# Patient Record
Sex: MALE
[De-identification: names, ages, dates, MRNs, and addresses within clinical notes are randomized per-mention and may not be internally consistent; named-entity substitution may affect disease eponyms.]

---

## 2017-07-21 ENCOUNTER — HOSPITAL ENCOUNTER (INPATIENT)
Dept: HOSPITAL 68 - ERH | Age: 32
LOS: 7 days | DRG: 750 | End: 2017-07-28
Attending: PSYCHIATRY & NEUROLOGY | Admitting: PSYCHIATRY & NEUROLOGY
Payer: COMMERCIAL

## 2017-07-21 VITALS — WEIGHT: 301.37 LBS | HEIGHT: 71 IN | BODY MASS INDEX: 42.19 KG/M2

## 2017-07-21 DIAGNOSIS — F25.9: Primary | ICD-10-CM

## 2017-07-21 DIAGNOSIS — I10: ICD-10-CM

## 2017-07-21 LAB
ABSOLUTE GRANULOCYTE CT: 4.7 /CUMM (ref 1.4–6.5)
BASOPHILS # BLD: 0 /CUMM (ref 0–0.2)
BASOPHILS NFR BLD: 0.4 % (ref 0–2)
EOSINOPHIL # BLD: 0.2 /CUMM (ref 0–0.7)
EOSINOPHIL NFR BLD: 2.5 % (ref 0–5)
ERYTHROCYTE [DISTWIDTH] IN BLOOD BY AUTOMATED COUNT: 13.7 % (ref 11.5–14.5)
GRANULOCYTES NFR BLD: 57.4 % (ref 42.2–75.2)
HCT VFR BLD CALC: 42 % (ref 42–52)
LYMPHOCYTES # BLD: 2.6 /CUMM (ref 1.2–3.4)
MCH RBC QN AUTO: 28.5 PG (ref 27–31)
MCHC RBC AUTO-ENTMCNC: 33.2 G/DL (ref 33–37)
MCV RBC AUTO: 85.9 FL (ref 80–94)
MONOCYTES # BLD: 0.6 /CUMM (ref 0.1–0.6)
PLATELET # BLD: 195 /CUMM (ref 130–400)
PMV BLD AUTO: 9.4 FL (ref 7.4–10.4)
RED BLOOD CELL CT: 4.89 /CUMM (ref 4.7–6.1)
WBC # BLD AUTO: 8.2 /CUMM (ref 4.8–10.8)

## 2017-07-21 PROCEDURE — G0463 HOSPITAL OUTPT CLINIC VISIT: HCPCS

## 2017-07-21 PROCEDURE — G0480 DRUG TEST DEF 1-7 CLASSES: HCPCS

## 2017-07-21 NOTE — NUR
PT REQUESTING TO TAKE A SHOWER. EDUCATED THAT BLOODWORK AND URINE NEED TO BE
OBTAINED AND CHANGED INTO SCRUBS
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
## 2017-07-21 NOTE — NUR
PT SLEEPING WITH RR, BILATERAL CHEST RISE AND FALL NOTED. PT NOTED TO BE
SLEEPING ON ABD/LEFT SIDE SNORING. SITTER IN PLACE FOR SAFETY.
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
## 2017-07-21 NOTE — NUR
blood drawn and sent (blue sst lav) and urine trio sent. pt changed into blue
scrubs. medicated with haldol and atarax per Emar. Patient states, "my family
doesn't care that i want to kill myself and i have a plan. I have heart
failure, they don't care. I've committed a lot of sins and I dont have a wife.
My family thinks I'm retarded. Can i go to [facility] now?"

## 2017-07-21 NOTE — NUR
PT SLEEPING WITH RR, WILL CONTINUE TO MONITOR, SITTER IN PLACE FOR SAFETY. EAR
PLUGS PROVIDED TO PT BY THIS RN FOR SLEEP AND COMFORT.

## 2017-07-22 NOTE — NUR
PT REQUESTING ATARAX AND HALDOL.  CONSULT WITH MD FOR ORDERS. PT WAS CALM AND
COOPERATIVE AND RETING ON THE STRETCHER.

## 2017-07-22 NOTE — NUR
PT AWAKE AND ASKING FOR TISSUES AND WHEN BREAKFAST WILL ARRIVE, THIS RN
PROVIDED PT WITH TISSUES AND TOLD PT 0700. SITTER IN PLACE FOR SAFETY.

## 2017-07-22 NOTE — NUR
PT RESTING QUIETLY ON STRETCHER. NAD NOTED. VSS. PT USED PHONE AND HAS BEEN
CALM AND COOPERATIVE WITH STAFF AT THIS TIME.

## 2017-07-22 NOTE — NUR
PT C/O ANXIETY, BECOMING AGITATED, REQUESTING MEDS TO HELP HIM TO SLEEP.
MD TESSIE MADE AWARE. 
PT MEDICATED WITH HALDOL, ATIVAN, AND ATARAX PER EMAR.
SITTER AT BEDSIDE.

## 2017-07-22 NOTE — NUR
RESUMED CARE OF PT FROM PREVIUS RN. PT RESTING QUIETLY ON STRETCHER. PT ADVISED
AWAITING FOR CONULT WITH [id number]. NAD NOTED WILL CONTINUE TO MONITOR.

## 2017-07-22 NOTE — NUR
ASSUMED CARE OF PT PER RN CHELCIA. PT SLEEPING WITH RR, WILL CONTINUE TO
MONITOR. SITTER IN PLACE FOR SAFETY.
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
## 2017-07-22 NOTE — NUR
PT SLEEPING WITH RR, TOSSING AND TURNING, BILATERAL CHEST RISE AND FALL NOTED.
WILL CONTINUE TO MONITOR, SITTER IN PLACE FOR SAFETY.

## 2017-07-22 NOTE — NUR
Patient on PEC, crisis conducting bed search.
Referrals sent to [facility], [facility], and [facility].

## 2017-07-22 NOTE — NUR
PT BECOMING ANXIOUS AND ARGUMENTITIVE WITH SITTER REGARDING LEAVING THE
DEPARTMENT BECAUSE HE HAS NOT BEEN SEEN BY CRISIS. CHARGE RN CURRENTLY SPEAKING
WITH PATIENT

## 2017-07-23 NOTE — NUR
PT SLEEPING WTIH RR, BILATERAL CHEST RISE AND FALL NOTED. SITTER IN PLACE FOR
SAFETY. WILL CONTINUE TO MONITOR

## 2017-07-23 NOTE — NUR
MED WITH ATARAX AND HALDOL AS PER PATIENT REQUEST. SITTER REMAINS IN PLACE FOR
CONSTANT OBSERVATION AND PATIENT SAFETY.

## 2017-07-23 NOTE — NUR
PT COOPERATIVE IN ROOM WITH SITTERS PRESENT. CONTINUES TO PRESENT WITH SOMEWHAT
BIZARRE, FLAT AFFECT. COOPERATIVE AND FOLLOWING BEHAVIORAL EXPECTATIONS AT THIS
TIME. OFFERS NO PHYSICAL COMPLAINTS

## 2017-07-23 NOTE — NUR
PATIETN AMB TO RESTROOM. DID NOT REQUIRE ASSISTANCE - SITTER IN PLACE FOR
SAFETY. AMB RETURN TO STRETCHER WITHOUT ASSISTANCE.

## 2017-07-23 NOTE — NUR
PT WAS FOUND TO BE INAPPROPRIATELY TOUCHING HIMSELF IN THE HALLWAY. PT ADVISED
THIS WAS NOT APPROPRIATE BEHAVIOR AND WAS EASILY RE-DIRECTED. pT IS STILL VERY
ANXIOUS IN REGARDS TO HIS PLAN OF CARE. AWAITING FURTHER ORDERS AND DISPO, NAD
NOTED. VSS.
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
## 2017-07-23 NOTE — NUR
RESUMED CARE OF PT FROM PREVIOUS RN.
PT RESTING QUIETLY ON STRETCHER. PT IS CALM AND COOPERATIVE WITH STAFF. NAD
NOTED. VSS.

## 2017-07-23 NOTE — NUR
PT NOW PLACED IN RM 14. PT PROVIDED SNACK AND TOOK SHOWER PRIOR TO DINNER. PT
IS CALM AD COOPERATIVE AT THIS TIME. NAD NOTED. VSS.

## 2017-07-23 NOTE — NUR
patient remains sleeping at present. lights remain dimmed to promote rest and
comfort. sitter remains in place for constant observation and patient safety.
will continue to monitor.

## 2017-07-23 NOTE — NUR
PT RESTING ON STRETCHER. PT VOICES CONCERN THAT HIS "MEDICATIONS ARE NOT
STABILIZING [HIM]". pT APPEARS SLIGHTLY ANXIOUS BUT IS STILL ABLE TO BE
RE-DIRECTED. PT PROVIDED A SNACK. NAD NOTED. WILL CONTINUE TO CLOSELY MONITOR.

## 2017-07-23 NOTE — NUR
PT RESTING ON STRETCHER IN HALLWAY STATING HE COULD NOT BREATH. PT IN AD. PT
STATES IN Hungarian THAT HE IS "HERE TO REGULATE HIMSELF MEDS". PT WAS ABLE TO BE
EASILY RE-DIRECTED. PT PROVIDED A SNACK AND CONSTANT OBS MAINTAINED. NAD NOTED.
VSS.

## 2017-07-23 NOTE — NUR
NOTIFIED OF PTS BP BY [facility] SAVANNAH. MD AWARE. PLAN IS TO RECHECK IN AN HOUR

## 2017-07-24 VITALS — DIASTOLIC BLOOD PRESSURE: 97 MMHG | SYSTOLIC BLOOD PRESSURE: 165 MMHG

## 2017-07-24 NOTE — NUR
PT UP IN ROOM, STATING "I NEED TO GO, I HAVE COURT AND I HAVE  CARE." PT
REDIRECTABLE AT THIS TIME, UNDERSTANDS THAT HE NEEDS TO WAIT FOR CRISIS,
HOWEVER, STATING "THEY NEED TO SEE ME FIRST, I CANT STAY HERE, IM NOT GOING TO
OTHER HOSPITALS."

## 2017-07-24 NOTE — NUR
PT ADJUSTING TO THE UNIT. PT SOMEWHAT BIZARRE, BUT NO THREATENING BEHAVIORS.
PT QUIET AND ON THE PERIPHERY.
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
## 2017-07-24 NOTE — NUR
ELIAZAR RN FROM "[employer] [facility]" RETURNED CALL FROM A [employer] STAFF
MEMBER-POSSIBLY MARKO . ELIAZAR'S NUMBER [phone] IF WE HAVE
ANY QUESTIONS FOR HER

## 2017-07-24 NOTE — NUR
ASSUMED CARE OF PT. PT SITTING IN CHAIR IN ROOM. ASKING FOR SOMETHING TO EAT.
OTHERWISE WITHOUT COMPLAINTS
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
## 2017-07-24 NOTE — NUR
PT REMAINS ASLEEP AT THIS TIME W/RR NOTED. NAD. EQUAL CHEST RISE AND FALL.
SITTER REMAINS IN CLEAR VIEW FOR SAFETY.

## 2017-07-25 VITALS — SYSTOLIC BLOOD PRESSURE: 152 MMHG | DIASTOLIC BLOOD PRESSURE: 95 MMHG

## 2017-07-25 VITALS — SYSTOLIC BLOOD PRESSURE: 144 MMHG | DIASTOLIC BLOOD PRESSURE: 85 MMHG

## 2017-07-25 VITALS — SYSTOLIC BLOOD PRESSURE: 145 MMHG | DIASTOLIC BLOOD PRESSURE: 85 MMHG

## 2017-07-25 VITALS — DIASTOLIC BLOOD PRESSURE: 84 MMHG | SYSTOLIC BLOOD PRESSURE: 152 MMHG
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
## 2017-07-25 NOTE — NUR
PT IS EASILY IRRITATED THIS SHIFT. BECAME ANGRY WHEN HE THOUGHT THE "NURSE
DIDN'T CALL ME FOR MY MEDS", WHEN IN REALITY HE DIDN'T HAVE ANY MEDICATION DUE
AT THE MOMENT. PT CAN BE DEMANDING AT TIMES ASKING FOR PAPER, COLORING
PENS/MARKERS. PT IS REDIRECTABLE FOR THE MOST PART. INTERMITTENTLY ATTENDING
GROUPS THIS AM. VS ARE STABLE AND DENIES ANY SI/HI TO THIS MHW.

## 2017-07-25 NOTE — NUR
PT IN ROOM MAJORITY OF THE EVENING.  HIS INTERACTIONS WITH HIS PEERS HAVE BEEN
MINIMAL.  PT IS EASILY IRRITATED ABOUT SMALL THINGS SUCH AS OPENING HIS DOOR
TO CHECK ON HIM, NOT PASSING OUT MEALS RIGHT AWAY WERE JUST A FEW THINGS THAT
IRRITATED HIM TONIGHT. PT SPENT MOST OF THE NIGHT DRAWING PICTURES OF
ROOSTERS, AND CHICKENS.  HE IS DEMANDING WHEN HE DOES NOT GET HIS NEEDS MET
RIGHT AWAY.  PT DENIES THOUGHTS TO HURT HIMSELF WHEN ASKED.
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
## 2017-07-25 NOTE — SOCIAL WORKER PROG NOTE PSYCH
Social Work Progress Note
Progress Note
11:10am
This writer met with patient.  Patient presented as guarded and irritable.  
Patient was unwilling to discuss current stressors and/or events that led to 
this hospitalization other than to say that he had been suicidal.  Upon 
discussing outpatient providers, patient acknowldeged that he has been engaged 
in treatment at [facility] and would like to return to them after discharge.  
Patient's irritability increased when reviewing the [facility]MOND for  [facility] and he left 
the room.  Patient will be approached at a later time regarding the ARMOND.
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
## 2017-07-26 VITALS — SYSTOLIC BLOOD PRESSURE: 142 MMHG | DIASTOLIC BLOOD PRESSURE: 66 MMHG

## 2017-07-26 VITALS — SYSTOLIC BLOOD PRESSURE: 144 MMHG | DIASTOLIC BLOOD PRESSURE: 60 MMHG

## 2017-07-26 VITALS — SYSTOLIC BLOOD PRESSURE: 160 MMHG | DIASTOLIC BLOOD PRESSURE: 95 MMHG

## 2017-07-26 VITALS — SYSTOLIC BLOOD PRESSURE: 134 MMHG | DIASTOLIC BLOOD PRESSURE: 72 MMHG

## 2017-07-26 NOTE — NUR
PT DID NOT ATTEND ANY GROUPS THIS MORNING AND HE ISOLATED IN HIS ROOM. HE
TAKES HIS MEDS WITHOUT A PROBLEM. HE DENIED ANY THOUGHTS OF SUICIDE OR SELF
HARM WHEN ASKED

## 2017-07-26 NOTE — NUR
PT IS ISOLATIVE AND WITHDRAWN, REMAINING IN ROOM FOR MAJORITY OF EVENING.
MINIMAL INTERACTION WITH PEERS OR STAFF. PT HAS A VERY IRRITABLE EDGE AND
REPORTS "I WANT TO LEAVE RIGHT NOW." OVERALL COOPERATIVE AND COMPLIANT WITH
STAFF. NO COMPLAINTS OR SI REPORTED. PT HAS ANXIOUS MOOD AND
CONSTRICTED/IRRITABLE AFFECT.

## 2017-07-26 NOTE — SOCIAL WORKER PROG NOTE PSYCH
Social Work Progress Note
Progress Note
This writer was informed by nursing staff that the patient was meeting with his 
, Cathie Mcclellan, from [facility] who requested to meet with me.  This 
writer introduced self to VICTOR M Mcclellan.  Patient inquired as to why I was there 
and was reminded that I am patient's .  He continues to present as 
guarded with this writer.  Patient was in agreement with this writer speaking 
with VICTOR M Mcclellan following their discussion.
 
3:22pm
Cathie Mcclellan was contacted by phone as this writer was not available to meet 
with her following her meeting with the patient.  She stated that the patient 
typically presents as defensive and paranoid when he is asked questions.  She 
stated that he is guarded with his therapist at [facility] and generally more open/
less guarded with the [facility] psychiatrist.  Cathie stated that in addition to 
medication management, individual therapy and case management he utilizes 
visiting nurse services, [facility], (primary nurse, Sarah Orantes RN, [phone]
[phone]).  Cathie also added that the patient has a pending court case and will 
inform this writer if she is informed of the date.  [facility] will be contacted 
when a discharge date is identified in order to schedule outpatient 
appointments.

## 2017-07-26 NOTE — CP SOUTH PROGRESS NOTE PSYCH
Psych (Inpt) Progress Note
Progress Note
Include the following elements, when applicable:
Involvement in the active treatment of the patient with behavioral observations 
of the patient and the patient's response to the treatment.
Review of the ongoing treatment process in the context of the treatment plan.
Indication of how multi-disciplinary staff members are carrying out the 
treatment plan.
Plans for future interventions and recommendations for revision of the treatment
plan.
Liaison with other physicians/providers.
Progress Note:
I discussed this patient's progress to date, current mental status, treatment 
process in the context of the treatment plan, and discharge planning with staff/
team in the daily morning inpatient team meeting. I also met with the patient  
myself in individual session.
 
              Current Medications
 
 
  Sig/Santos Start time  Last
 
Medication Dose Route Stop Time Status Admin
 
Benztropine Mesylate 1 MG BID 07/24 2200 AC 07/26
 
  PO   0939
 
Haloperidol 100 MG Q30D 07/28 1000 CANr 
 
  IM   
 
Haloperidol 5 MG .STK-MED ONE 07/25 1820 DC 
 
  PO 07/25 1821  
 
Haloperidol 10 MG AT BEDTIME 07/24 2200 AC 07/25
 
  PO   2156
 
Haloperidol 5 MG Q6-PRN PRN 07/24 2145 AC 07/25
 
  PO   1820
 
Haloperidol 100 MG .[EVERY 4 WEEKS] 07/24 2130 CAN 
 
  IM   
 
Hydroxyzine HCl 25 MG Q6-PRN PRN 07/24 2145 AC 07/25
 
  PO   2158
 
Nicotine 2 MG Q2 HRS AS NEEDED PRN 07/24 2145 AC 07/25
 
  PO   1819
 
 
                                                                              
Vital Signs
 
 
Date Time Temp Pulse Resp B/P B/P Pulse O2 O2 Flow FiO2
 
     Mean Ox Delivery Rate 
 
07/26 0820 97.6 64  134/72     
 
07/25 2019 98.1 71  144/85     
 
07/25 1603  77  152/95     
 
07/25 1236  60  145/85     
 
 Laboratory Tests
 
 
 07/26
 
 0625
 
Chemistry 
 
  Potassium (3.5 - 5.1 mmol/L) 4.2
 
  Triglycerides (<150 mg/dL) 94
 
  Cholesterol (< 200 MG/DL) 151
 
  LDL Cholesterol, Calc (65 - 129 mg/dL) 108
 
  HDL Cholesterol (40 - 60 mg/dL) 25  L
 
  Cholesterol/HDL Ratio (0.00 - 4.88 %) 6  H
 
  TSH (0.270 - 4.200 uIU/mL) 3.660
 
 
 
A:
Chart, progress notes, vital signs and medication list reviewed. Vital signs 
within normal limits. TSH wnl. Low HDL and high Cholesterol/HDL ratio. Rpt 
potassium wnl. 
 
Per nursing staff, patient shows better behavioral and physical control today. 
Less irritable. More visible in the milieu, drawing. Interacting minimally but 
appropriately with staff. 
 
[facility] confirmed that patient is an active patient. Current 
medication list received: Haldol 10mg nightly, Haldol Decanoate 100mg IM [id number]
, Hydroxyzine 50mg BID PRN anxiety. Haldol Dec last administered on 6/27/17, 
confirmed by nursing supervisor, Maritza, of [facility].
 
Spoke to MERE Nguyễn, from [facility]. States he has worked with 
patient for the last 2-3 years. At baseline patient is paranoid and suspicious 
of is family and neighbors. To Gopi' knowledge he has never physically acted 
out towards his neighbors. Reports that in February 2017 he had been on Haldol 
20mg po nightly for psychosis, which he did well on. Reports patient tolerated 
this w/o symptoms of EPS. However, patient had reported that he did not like how
he felt on this dose. He began refusing dosage. At that time, Gopi had a 
conversation with Dr. Wilson from [facility] which resulted in Dr. Wilson 
decreasing HS dose to 10mg nightly. Gopi reports that the patient's symptoms 
were better managed on 20mg nightly in addition to Haldol Dec. He confirmed 
Haldol Dec dosage of 100mg IM [id number]. 
 
Patient seen at 3:00PM. Patient reports meeting /  [facility]  Cathie on 
unit today. He requested tx team speaks to her to arrange after care 
appointments. Reasurred patient that would take place. States feeling on edge 
today. Appears quite somatic. Reports he feels he had a heart attack last night.
States he had one at age 24 in [place]. Could not remember the name of the hospital 
where he had been hospitalized. Denies being treated by a cardiologist in his 
lifetime. VSS. Denies palpitations, shortness of breath, dizziness. No 
significant cardiac findings on H&P per internist Dr. Bry Ames. Patient
presently asymptomatic. 
Today, patient denies auditory and visual hallucinations. + somatic paranoia, 
preoccupied. Feels his body is "destructing." Could not articulate what he means
by this. Became briefly tearful. Reports racing thoughts, variable moods. Made 
inconsistent reports regarding his quality of sleep. First stated he cannot 
sleep at night, then reports sleeping well by using "strategies," i.e. counting 
backwards from 100. Reports appetite and energy level are normal. 
 
Recommendation made to add Depakote for mood stabilization. Patient refused. 
Stated he will only take Haldol. He requested an increase in HS dose to 20mg. 
Informed him we will first obtain an EKG to r/o any contraindication to increase
current dose of Haldol. Also informed patient that he is due for next dose of 
Haldol Decanoate tomorrow. He verbalized understanding, is agreeable.
 
Patient tolerating medications well, denies SEs. Amenable to tx plan discussed 
above.
 
P:
-monitor for safety, mood, psychosis, SI.
-obtain EKG; if wnl will increase Haldol to 15mg po nightly. Will continue PRN 
Haldol 5mg Q6H agitation/psychosis. 
-cont Cogentin 1mg BID.
-Haldol Decanoate 100mg IM scheduled for tomorrow.
-dispo planning per primary team.

## 2017-07-27 VITALS — SYSTOLIC BLOOD PRESSURE: 140 MMHG | DIASTOLIC BLOOD PRESSURE: 73 MMHG

## 2017-07-27 VITALS — DIASTOLIC BLOOD PRESSURE: 82 MMHG | SYSTOLIC BLOOD PRESSURE: 130 MMHG

## 2017-07-27 VITALS — DIASTOLIC BLOOD PRESSURE: 95 MMHG | SYSTOLIC BLOOD PRESSURE: 145 MMHG

## 2017-07-27 VITALS — SYSTOLIC BLOOD PRESSURE: 120 MMHG | DIASTOLIC BLOOD PRESSURE: 94 MMHG

## 2017-07-27 NOTE — NUR
PT IN ROOM MOST OF THE EVENING DRAWING.  HE IS IRRITABLE AT TIMES, BUT KEEPS
TO HIMSELF OTHERWISE.  PT IN THE [place] FOR DINNER, AND HAS BEEN COMING OUT
FOR VITALS.  HE DENIES THOUGHTS TO HURT HIMSELF WHEN ASKED.

## 2017-07-27 NOTE — NUR
PT IS IRRITABLE AT TIMES-- YELLING AT STAFF MEMBERS FOR OPENING HIS DOOR
DURING ROUTINE Q15'S. PT WAS EXPLAINED TO THAT THIS IS ROUTINE AND NECESSARY
AND HE RETREATED BACK TO HIS ROOM. PT IS EASILY AGITATED WHEN ASKED TO COME
OUT FOR VS/MEDICATIONS. NOT ATTENDING GROUPS. VS ARE STABLE AND DENIES ANY
SI/HI TO THIS [facility]HW.

## 2017-07-27 NOTE — CP SOUTH PROGRESS NOTE PSYCH
Psych (Inpt) Progress Note
Progress Note
Include the following elements, when applicable:
Involvement in the active treatment of the patient with behavioral observations 
of the patient and the patient's response to the treatment.
Review of the ongoing treatment process in the context of the treatment plan.
Indication of how multi-disciplinary staff members are carrying out the 
treatment plan.
Plans for future interventions and recommendations for revision of the treatment
plan.
Liaison with other physicians/providers.
Progress Note:
I discussed this patient's progress to date, current mental status, treatment 
process in the context of the treatment plan, and discharge planning with staff/
team in the daily morning inpatient team meeting. I also met with the patient  
myself in individual session.
 
 Current Medications
 
 
  Sig/Santos Start time  Last
 
Medication Dose Route Stop Time Status Admin
 
Benztropine Mesylate 1 MG Q4 HRS AS NEEDED PRN 07/26 1545 AC 
 
  PO   
 
Benztropine Mesylate 1 MG BID 07/24 2200 AC 07/27
 
  PO   0750
 
Haloperidol 100 MG Q30D 07/27 1000 CAN 
 
  IM 07/27 1001  
 
Haloperidol 100 MG Q30D 07/27 1000 DC 07/27
 
  IM 07/27 1001  1146
 
Haloperidol 15 MG AT BEDTIME 07/26 2200 AC 07/26
 
  PO   2214
 
Haloperidol 5 MG Q6-PRN PRN 07/24 2145 AC 07/27
 
  PO   0750
 
Hydroxyzine HCl 50 MG .STK-MED ONE 07/26 1942 DC 
 
  PO 07/26 1943  
 
Hydroxyzine HCl 25 MG Q6-PRN PRN 07/24 2145 AC 07/26
 
  PO   1941
 
Lorazepam 1 MG Q4 HRS AS NEEDED PRN 07/27 1000 AC 
 
  PO   
 
Nicotine 4 MG Q2 HRS AS NEEDED PRN 07/26 1515 AC 
 
  PO   
 
 
                                                                         Vital 
Signs
 
 
Date Time Temp Pulse Resp B/P B/P Pulse O2 O2 Flow FiO2
 
     Mean Ox Delivery Rate 
 
07/27 1213  84  130/82     
 
07/27 0744 98.2 61  120/94     
 
07/26 2001 98.5 61  160/95     
 
07/26 1717  66  144/60     
 
 
A:
Chart, progress notes, vital signs and medication list reviewed. Vital signs 
within normal limits. EKG reviewed, showed sinus rhythm with no significant 
changes. No new lab results today.
 
Per nursing staff, patient keeps mostly to himself, somewhat guarded, minimally 
interacts with others. Less irritable, attending to adls, not attending groups.
 
Patient seen in room at 3:43PM. Initially observed laying in bed. Acknowledged 
writer and sat up to talk. States that his "mental health is well." Reports mood
is "normal." Patient focused on discharge, anxious about court appearance on 
Monday, 7/31/17 for recent disorderly conduct charge. Denies acute symptoms of 
anxiety and depression. Denies feeling hopeless, helpless, worthless and guilty.
Denies AH and VH. Still guarded, but less suspicious today than in prior 
encounters. Offered no somatic complaints. Remains focused on returning to  
[facility] for outpatient tx. Denies SI and HI. No evidence of overt paranoia towards 
anything particularly, or of delusional/illogical thought content. Thought 
process concrete, goal directed. Cognition grossly intact.
 
Pt reports tolerating recent increase in Haldol to 15mg at bedtime. Received 
Haldol Dec 100mg IM today. Denies SEs, no evidence of movement disorder, 
agreeable to continue taking.  
 
P:
-cont monitoring for safety, mood, SI, psychosis.
-cont Haldol 15mg nightly, cont Cogentin 1mg BID; cont Haldol 5mg Q6H PRN for 
agitation/psychosis.
-tentative discharge tomorrow w/ f/u at  [facility] for individual therapy/med 
management/case management.

## 2017-07-27 NOTE — SOCIAL WORKER PROG NOTE PSYCH
**See Addendum**
Social Work Progress Note
Progress Note
This writer spoke with patient regarding interest in a family meeting.  Patient 
initially appeared interested, however, ultimately stated that a meeting with 
his family is not needed as "I'm 32 years old."  Patient continues to present as
guarded during his discussion with this writer.

## 2017-07-27 NOTE — SOCIAL WORKER PROG NOTE PSYCH
Social Work Progress Note
Progress Note
 
 
Determination Status:
****************************     PENDED     ****************************
The services requested require additional review. You will be contacted 
regarding the status of this request if further information is needed. An 
authorization decision will be made within the required timeframes and details 
of that decision may be found under the member's authorization history.
Member Name
Member ID
Member 
Subscriber Name
Subscriber ID
BRENT ROMERO JR
[id number]
1985
BRENT ROMERO  
[id number]
 
Pended Authorization #
Client Authorization #
Type of Request
 
[id number]
[id number]
CONCURRENT
 
 
Date of Admission/ Start of Services
Requested From
Submission Date
   
2017

## 2017-07-28 VITALS — DIASTOLIC BLOOD PRESSURE: 73 MMHG | SYSTOLIC BLOOD PRESSURE: 125 MMHG

## 2017-07-28 NOTE — CP SOUTH PROGRESS NOTE PSYCH
Psych (Inpt) Progress Note
Progress Note
Include the following elements, when applicable:
Involvement in the active treatment of the patient with behavioral observations 
of the patient and the patient's response to the treatment.
Review of the ongoing treatment process in the context of the treatment plan.
Indication of how multi-disciplinary staff members are carrying out the 
treatment plan.
Plans for future interventions and recommendations for revision of the treatment
plan.
Liaison with other physicians/providers.
Progress Note:
Chart, progress notes, labs, vital signs and medication list reviewed. Vital 
signs within normal limits. No new lab results today. 
 
 Current Medications
 
 
  Sig/Santos Start time  Last
 
Medication Dose Route Stop Time Status Admin
 
Benztropine Mesylate 1 MG Q4 HRS AS NEEDED PRN 07/26 1545 AC 
 
  PO   
 
Benztropine Mesylate 1 MG BID 07/24 2200 AC 07/28
 
  PO   0821
 
Haloperidol 100 MG Q30D 07/27 1000 DC 07/27
 
  IM 07/27 1001  1146
 
Haloperidol 15 MG AT BEDTIME 07/26 2200 AC 07/27
 
  PO   2145
 
Haloperidol 5 MG Q6-PRN PRN 07/24 2145 AC 07/27
 
  PO   0750
 
Hydroxyzine HCl 25 MG Q6-PRN PRN 07/24 2145 AC 07/26
 
  PO   1941
 
Lorazepam 1 MG Q4 HRS AS NEEDED PRN 07/27 1000 AC 
 
  PO   
 
Nicotine 4 MG Q2 HRS AS NEEDED PRN 07/26 1515 AC 
 
  PO   
 
 
                                                                    Vital Signs
 
 
Date Time Temp Pulse Resp B/P B/P Pulse O2 O2 Flow FiO2
 
     Mean Ox Delivery Rate 
 
07/28 0744 96.4 92  125/73     
 
07/27 1950 97.3 84  145/95     
 
07/27 1604  92  140/73     
 
07/27 1213  84  130/82     
 
 
A:
Chart, progress notes, vital signs and medication list reviewed. Vital signs 
within normal limits. No new lab results today.
 
Patient seen at 8:40AM. Patient presents in good behavioral and physical 
control. Appears mildly restless, fidgety on encounter. States his mood is 
"good." Appears less guarded since yesterday. Focused on discharge today. States
he would like to speak to his , Cathie, at [facility], regarding retirement 
diversion. Patient determined to get to court on Monday, 7/31/17. Affect 
constricted, slightly irritable. States he is anxious about upcoming court, but 
showed fair insight that recent charge cannot be addressed until he shows to 
court. He denies suicidal and homicidal ideation. Denies auditory and visual 
hallucinations. Slightly paranoid, however, consistent to baseline reports from 
both his  and visiting nurse who have known him for years. No 
evidence of jabier delusions. Thought process concrete, goal directed. Cognition 
grossly intact. Patient showed good eye contact. Speech was irritable at times, 
otherwise normal in rate, tone and volume. He offered no complaints. Reports 
tolerating medications well, denies side effects. There is no evidence of 
movement disorder. Denies akathisia. He reports feeling safe and ready for 
discharge, and is very eager to follow up at [facility] for continued outpatient 
treatment and his visiting nurse.
 
P:
-discharge today to home/self care.
-f/u at  [facility] for outpatient [facility] services and [facility] 
for daily med administration (see D/C instructions for appointments).
-all discharge medications e-prescribed to [facility] in [place], [place], 
today.
-patient advised that in the event of an emergency to call [phone] to the 
nearest emergency department; he verbalized understanding of instructions.

## 2017-07-28 NOTE — SOCIAL WORKER PROG NOTE PSYCH
Social Work Progress Note
Progress Note
This writer met with patient to review discharge plans as follows:
 
-[facility], individual therapy with Ramonita Spaulding on 8/3/17 at 3pm
-[facility], medication appointment with Dr. Wilson on 8/23/17 at 6:20pm
-[facility], case management with Cathie Mcclellan on 8/3/17 at 2pm
-[facility]: visiting nurse will visit patient at his home this 
afternoon/evening
 
Patient was in agreement with attending/accepting these appointments.  He was 
offered an appointment with GENE Weaver through [facility] due to the amount 
of time until his medication appointment at [facility].  He refused this 
appointment.  He was also offered a ride via [employer] from the hospital, 
however, refused this as well.  Patient stated that he has court scheduled for 7
/31/17, however, Cathie Mcclellan () was unable to confirm this.  She 
was unable to meet with him today, but will follow up with him on Monday in 
addition to their appointment on 8/3/17.
 
This writer was provided with the fax number for [facility] (spoke 
with Gopi): [phone].  W10 and patient health summary was faxed to [facility] on 7/28/17 at 2:58pm, fax confirmation sheet printed and filed with 
documents in the patient's chart.
 
This writer faxed W10 and patient health summary to [facility] on 7/28/17 at 2:
58pm.  Fax confirmation sheet and documents filed in patient's chart.

## 2017-07-28 NOTE — NUR
PT IS TENTATIVELY SCHEDULED TO DISCHARGE TODAY, PRESENT WITHIN COMMUNITY AND
SOCIAL WITH PEERS AND STAFF, NO ISSUES OR COMPLAINTS REPORTED OR OBSERVED.
WHEN ASKED DIRECTLY DENIES SI/HI/HA AT THIS TIME. DISCHARGE DISPO DISCUSSED
WITH WITH PT AND HAS A POSITIVE UNDERSTANDING AND VERBALIZES MOTIVATION,
FUTURE ORIENTED, MOOD IS STABLE WITH A FULL RANGE AFFECT. UNDERSTANDS
MEDICATION REGIMEN AND INFORMATION PACKETS RE: SI PREVENTION, DEPRESSION,
SCHIZOAFFECTIVE DISORDER GIVEN AND PT RESOURCE GUIDE REVIEWED WITH ALL OTHER
PERTINANT INFORMATION I.E. [facility], [facility], SUICIDE HOTLINE NUMBERS ETC
AND VERBALIZED UNDERSTANDING AND NO QUESTIONS.

## 2017-07-28 NOTE — DISCHARGE SUMMARY REPORT-PSYCH
Visit Information
 
Visit Dates/Diagnosis'
Admission Date:
07/24/17
 
Discharge Date: 07/28/17
Reason for Admission:
Physical agitation and suicidal ideation in the setting of recent arrest.
Psy Discharge Primary Diag: Schizoaffective disorder
Psy Discharge Secondary Diag: Hypertension by history
 
Hospital Course
Significant Lab Findings:
 Lab
 
 
Cholesterol/HDL Ratio 6 % H 07/26/17 0625
 
HDL Cholesterol 25 mg/dL L 07/26/17 0625
 
 
7/26/17 EKG: Sinus rhythm with a rate of 64. Consider inferior infarct. 
Borderline R wave progression, anterior leads. No significant change since 
previous tracing. Borderline EKG confirmed by cardiologist Dr. Kevin Del Rosario.
 
Course
Complications:
None.
Consultations:
The patient was seen for admission history and physical by internist Dr. Bry Ames. Please see his note for additional information.
Allergies:
Coded Allergies:
No Known Drug Allergies (05/21/16)
 
Hospital Course/TX Response:
The patient was monitored on the unit for safety, mood, suicidal and homicidal 
ideation, agitated behavior and psychosis. He participated in multimodal 
treatments on the unit. Haldol 10mg po nightly was initially continued on 
admission for paranoia. Patient was agreeable to a gradual increase to 15mg 
nightly. The patient had last recieved Haldol Decanoate 100mg IM on 6/27/17, 
which was confirmed with [facility] nursing supervisor. The patient
received his scheduled monthly Haldol Decanoate 100mg IM on  7/27/17 at 11:46AM.
Cogentin 1mg twice daily was continued for muscle stiffness. Hydroxyzine PRN was
continued for anxiety. The patient tolerated all medications well, he denied 
side effects and did not exhibit signs or symptoms of a movement disorder. 
 
During the hospital course, the patient's mood and affect improved. Suicidal 
ideation remitted. He consistently denied auditory and visual hallucinations, 
and homicidal ideation. The patient continued to appear slightly paranoid, 
however, per collateral from both his  at [facility] and visiting nurse,
this is consistent with his baseline. The patient refused having family involved
in a his inpatient care. He was agreeable to return to [facility] for case 
management, individual therapy and medication management. He was also willing to
resume services with [facility] for daily medication 
administration. 
 
On the date of discharge, 7/28/17, the patient presented in good behavioral and 
physical control. Alert and oriented x 3. Appeared mildly restless, fidgety on 
encounter. Stated his mood is "good." Appeared less guarded since yesterday. 
Focused on discharge today. Stated he would like to speak to his , 
Cathie, at [facility], regarding group home diversion. Patient appeared determined to get 
to court on Monday, 7/31/17. Affect constricted, slightly irritable. Stated he 
is anxious about upcoming court, but showed fair insight that recent charge 
cannot be addressed until he shows to court. He denied suicidal and homicidal 
ideation. He stated and also believed he will not harm himself or others. Denied
auditory and visual hallucinations. Slightly paranoid, however, consistent to 
baseline reports from both his  and visiting nurse who have known him
for years. No evidence of jabier delusions. Thought process concrete, goal 
directed. Cognition grossly intact. Patient showed good eye contact. Speech was 
irritable at times, otherwise normal in rate, tone and volume. He offered no 
complaints. Reported tolerating medications well, denied side effects. No 
evidence of movement disorder. Denied akathisia. He reported feeling safe and 
ready for discharge, and appeared eager to follow up at [facility] for continued 
outpatient treatment and with his visiting nurse.
 
 
Discharge HBIPS
- Tobacco Use Treatment Offered
Post DC Medications Offered: Script Given-See Med List
Post DC Tobacco Treatment Plan: Refused Tobacco Tx Pgm
- EtOH/Drug Use D/O Treatment Offered
Post DC Medications Offered: NA-No EtOH/Drug Use D/O
Post DC EtOH/SubAbuse [place] Plan: NA-No EtOH/Drug Use D/O
 
Metabolic Screening
- Screen if on a Neuroleptic Medication
- Metabolic screening should include:
- Blood Pressure, BMI, Glucose or Hgb A1c, & a
- Lipid profile from within the past 365 days.
Metabolic Screening
() Not Applicable, patient not on a neuroleptic.
 
 OR
 
([X]) Patient on a neuroleptic(s) .
     Enter below results for Glucose or Hemoglobin A1C, 
     and lipid panel if obtained during the last 365 days.
 
BMI: 42.000     
 
Blood Pressure: 125/73
 
Laboratory Results (If applicable):
 
 Lab
 
 
Cholesterol 151 MG/DL 07/26/17 0625
 
Cholesterol/HDL Ratio 6 % H 07/26/17 0625
 
Glucose 101 mg/dL H 07/21/17 2125
 
HDL Cholesterol 25 mg/dL L 07/26/17 0625
 
LDL Cholesterol, Calc 108 mg/dL 07/26/17 0625
 
Triglycerides 94 mg/dL 07/26/17 0625
 
 
 
 
Discharge Instructions
 
General Discharge Information
Discharge Medications:
Discharge Medications- (Dose, route, freq, indication):
 
START taking these NEW Home Medications:
 
 
 Nicotine Polacrilex   Dose:   ORAL, EVERY 2 HOURS AS  Qty: 1   Sent to 
 
 (Nicorelief) 4 MG   4 Milligram   NEEDED as needed for  Refills: 0   Pharm 1 
 
 GUM     nicotine cravings    
 
     Take 1 piece po Q2HRs    
 
     PRN for nicotine    
 
     cravings. NTE 5 pieces/24    
 
     hours.    
 
                                                                                
          
 
 Haloperidol   Dose:   ORAL, AT BEDTIME for  Qty: 41   Sent to 
 
 (Haloperidol) 10 MG   15 Milligram   clear thoughts  Refills: 0   Pharm 1 
 
 TABLET     Take 1 and 1/2 tabs    
 
     (15mg) po QHS.    
 
                                                                                
          
 
 
CHANGES to Home Medications:
 
 
 Benztropine Mesylate   Dose:  ORAL, TWICE DAILY for  Called in to
 
 (Benztropine Mesylate) 1  1 Tablet   muscle stiffness  Pharm 1 
 
 MG TABLET     Take 1 tab po BID.  
 
     (changed from: TWICE  
 
     DAILY for AS DIRECTED [  
 
     No  
 
     Instructions ])  
 
                                                                                
           
 
 Hydroxyzine HCl   Dose:  ORAL, 2 x Daily as  Sent to 
 
 (Hydroxyzine HCl) 50 MG   1 Tablet   needed as needed for  Pharm 1 
 
 TABLET     anxiety  
 
     Take 1 tab po BID PRN.  
 
     (changed from: AS  
 
     NEEDED as needed for AS  
 
     DIRECTED  
 
     [ No Instructions ])  
 
                                                                                
           
 
 Haloperidol Decanoate   Dose:  INTRAMUSC, ONCE A MONTH  Sent to 
 
 (Haldol Decanoate 100)   1 Milliliters   for clear thoughts  Pharm 1 
 
 100 MG/ML AMPUL     Take 100mg/ml IM Q30  
 
     days. Last given:  
 
     7/27/17. Next due:  
 
     8/27/17.  
 
     (changed from: ONCE A  
 
     MONTH for MENTAL HEALTH  
 
     [ No  
 
     Instructions ])  
 
                                                                                
           
 
 
STOP taking these DISCONTINUED Home Medications:
 
 
 Quetiapine Fumarate (Seroquel   Dose:   ORAL, Every night for MENTAL
 
 XR) 300 MG TAB.ER.24H   1 Tablet   HEALTH
 
     Reason Stopped: Per Doctor
 
     Decision
 
                                                                                
             
 
 Haloperidol (Haloperidol) 10   Dose:   ORAL, AT BEDTIME for AS DIRECTED
 
 MG TABLET   1 Tablet   Reason Stopped: Per Doctor
 
     Decision
 
                                                                                
             
 
 
1: Veevas Teads ([address] [place], [place] [id number] [phone]
[phone]
 
Your Preferred Pharmacy
[facility] ([address] [place], [place] [id number]
[phone]
 
 
Multiple Neuroleptics:
([X]) Not Applicable
      
     OR
   
 Document below three failed attempts at monotherapy, or a plan to taper to 
 monotherapy, or augmentation of Clozapine.
 ()
 
Patient's Diet:
Regular.
Patient's Activity:
No restrictions.
DC Disposition:
Patient to return home.
Recommendations:
The patient was advised to please take his medications as prescribed. He was 
advised to follow up with scheduled outpatient appointments at [facility] and with 
visiting nurse for daily medication administration. He was advised that in the 
event of an emergency to call [phone] to the nearest emergency department. 
Patient verbalized understanding of all instructions. 
Referred To:
Service Date: 08/03/17
Referred To: [facility]
Notes: 
  [address]  
 (t) [phone]  
    
*Individual therapy appointment: 3pm, 8/3/17 with Ramonita Spaulding.
 
Provider Referral
Service Date: 08/23/17
Referred To: [facility]
Notes: 
  [address]  
 (t) [phone]  
    
*Medication appointment: 8/23/17, 6:20pm with Dr. Wilson.
 
Provider Referral
Service Date: 07/28/17
Referred To: [facility]
[address] [place] [id number]
[phone])[phone]
 
*[facility] will see patient on the evening of 7/28/17 following 
patient's discharge from [facility].  Pharmacy used is [facility] in 
[place].
 
Provider Referral
Service Date: 08/03/17
Referred To: [facility]
Notes: 
  [address]  
  [phone]  
    
*Case management appointment with Cathie Mcclellan on 8/3/17 at 2pm.
 
Copies To:
[facility]; Dr. Wilson

## 2017-07-28 NOTE — PATIENT DISCHARGE INSTRUCTIONS
Psych Discharge Inst
 
General Discharge Information
Reason for Admission:
Suicidal ideation
Psy Discharge Primary Diag+ Schizoaffective disorder
Psy Discharge Secondary Diag+ Hypertension by history
Summary Tests/Major Procedures
 Lab
 
 
Cholesterol/HDL Ratio 6 % H 07/26/17 0625
 
HDL Cholesterol 25 mg/dL L 07/26/17 0625
 
 
Studies Pending at DC:
N/A
 
Patient Instructions
Contact Information
Your Psychiatrist on [facility] was LG RICO,JOSEPHINE VELAZQUEZ/CHRISS CHO.
 
* If you are experiencing an emergency related to this hospitalization, please 
call [phone] to contact the treating psychiatrist or the psychiatrist-on-
call.
 
* To Request a copy of your medical records, please contact the Medical Records 
Department at [phone].
 
* To request results of studies pending at the time of discharge, please call 
[phone].
 
* Continue your Medications until directed to stop by your Healthcare provider.
 
General Medication Information
Please continue to take your new medications and your continued home medications
, unless otherwise indicated on your discharge medication list, or unless 
directed by your MD or GENE to stop them.
 
Special Instructions:
DIET: Regular.
ACTIVITY LEVEL: No restrictions.
 
Advance Directives
Does the Patient have Medical Advance Directives No/Refused further info
Does Pt have Psychiatric Advance Directives? No/Refused further info
Does Patient have a Designated Surrogate Decision Maker: No
Information About Psychiatric Advance Directives Provided? Refused
 
Discharge Plan
Post Hospital Treatment Plan:
Provider Referral
Service Date: 08/03/17
Referred To: [[facility]]
Notes: 
  [address]  
  [phone]  
*Individual therapy appointment: 3pm, 8/3/17 with Ramonita Spaulding.
 
Provider Referral
Service Date: 08/23/17
Referred To: [[facility]]
Notes: 
  [address]  
  [phone]  
    
*Medication appointment: 8/23/17, 6:20pm with Dr. Wilson.
 
Provider Referral
Service Date: 07/28/17
Referred To: [[facility]]
Notes: 
  [facility] will see patient 
  on the evening of 7/28/17 following 
  patient's discharge from [facility].  Pharmacy used is [facility].
 
Provider Referral
Service Date: 08/03/17
Referred To: [[facility]]
Notes: 
  [address], [place]  
  [phone]  
    
*Case management appointment with Cathie Mcclellan on 8/3/17 at 2pm.
 
**In the event of an emergency, call [phone] to the nearest emergency 
department.**

## 2018-09-24 ENCOUNTER — HOSPITAL ENCOUNTER (EMERGENCY)
Dept: HOSPITAL 68 - ERH | Age: 33
End: 2018-09-24
Payer: COMMERCIAL

## 2018-09-24 VITALS — WEIGHT: 260 LBS | HEIGHT: 69 IN | BODY MASS INDEX: 38.51 KG/M2

## 2018-09-24 VITALS — DIASTOLIC BLOOD PRESSURE: 68 MMHG | SYSTOLIC BLOOD PRESSURE: 140 MMHG

## 2018-09-24 DIAGNOSIS — F20.9: Primary | ICD-10-CM

## 2018-09-24 DIAGNOSIS — R07.9: ICD-10-CM

## 2018-09-24 DIAGNOSIS — E11.9: ICD-10-CM

## 2018-09-24 DIAGNOSIS — F17.210: ICD-10-CM

## 2018-09-24 DIAGNOSIS — M21.611: ICD-10-CM

## 2018-09-24 DIAGNOSIS — I10: ICD-10-CM

## 2018-09-24 LAB
ABSOLUTE GRANULOCYTE CT: 2.2 /CUMM (ref 1.4–6.5)
BASOPHILS # BLD: 0 /CUMM (ref 0–0.2)
BASOPHILS NFR BLD: 0.6 % (ref 0–2)
EOSINOPHIL # BLD: 0.4 /CUMM (ref 0–0.7)
EOSINOPHIL NFR BLD: 6 % (ref 0–5)
ERYTHROCYTE [DISTWIDTH] IN BLOOD BY AUTOMATED COUNT: 13.8 % (ref 11.5–14.5)
GRANULOCYTES NFR BLD: 36.3 % (ref 42.2–75.2)
HCT VFR BLD CALC: 41.5 % (ref 42–52)
LYMPHOCYTES # BLD: 2.8 /CUMM (ref 1.2–3.4)
MCH RBC QN AUTO: 29.4 PG (ref 27–31)
MCHC RBC AUTO-ENTMCNC: 33.8 G/DL (ref 33–37)
MCV RBC AUTO: 86.7 FL (ref 80–94)
MONOCYTES # BLD: 0.6 /CUMM (ref 0.1–0.6)
PLATELET # BLD: 178 /CUMM (ref 130–400)
PMV BLD AUTO: 8.8 FL (ref 7.4–10.4)
RED BLOOD CELL CT: 4.78 /CUMM (ref 4.7–6.1)
WBC # BLD AUTO: 6 /CUMM (ref 4.8–10.8)

## 2018-09-24 PROCEDURE — G0480 DRUG TEST DEF 1-7 CLASSES: HCPCS

## 2018-09-24 NOTE — ED GENERAL ADULT
History of Present Illness
 
General
Chief Complaint: Chest Pain
Stated Complaint: "BIBA PER EMS CHEST HEAVY"
Source: patient
Exam Limitations: no limitations
 
Vital Signs & Intake/Output
Vital Signs & Intake/Output
 Vital Signs
 
 
Date Time Temp Pulse Resp B/P B/P Pulse O2 O2 Flow FiO2
 
     Mean Ox Delivery Rate 
 
09/24 1315 98.0 70 19 140/68  100 Room Air  
 
09/24 1127 98.2 68 17 138/72  98 Room Air  
 
09/24 0930 97.9 65 18 142/70  100 Room Air  
 
09/24 0758 98.1 66 18 148/72  100 Room Air  
 
 
 ED Intake and Output
 
 
 09/25 0000 09/24 1200
 
Intake Total  
 
Output Total  200
 
Balance  -200
 
   
 
Output, Urine  200
 
Patient  260 lb
 
Weight  
 
 
 
Allergies
Coded Allergies:
No Known Drug Allergies (05/21/16)
 
Reconcile Medications
Benztropine Mesylate 1 MG TABLET   1 TAB PO BID muscle stiffness
     Take 1 tab po BID.
Haloperidol 10 MG TABLET   15 MG PO AT BEDTIME clear thoughts
     Take 1 and 1/2 tabs (15mg) po QHS.
Haloperidol Decanoate (Haldol Decanoate 100) 100 MG/ML AMPUL   1 ML IM Q30D 
clear thoughts
     Take 100mg/ml IM Q30 days. Last given: 7/27/17. Next due: 8/27/17.
Hydroxyzine Hydrochloride (Atarax) 50 MG TABLET   1 TAB PO BIDP PRN anxiety
     Take 1 tab po BID PRN.
Nicotine Polacrilex (Nicorelief) 4 MG GUM   4 MG PO Q2 HRS AS NEEDED PRN 
nicotine cravings
     Take 1 piece po Q2HRs PRN for nicotine cravings. NTE 5 pieces/24
     hours.
 
Triage Note:
PT BIBA FROM HOME C/O A BLISTER ON L FOOT FOR 3
 YEARS THAT IS NOW "CAUSING MY HEART TO BE HEAVY."
 PT INSISTING "I NEED ANTIBIOTICS FOR MY HEART,"
 PT HX OF SCHIZOPHRENIA AND STATES HE HAS NOT SLEPT
 IN 5 DAYS BECAUSE OF THIS. PT REPORTS +AH,
 -SI/-HI. +SMOKER, -ETOH PER PT. EVAL'D BY MD MARTINEZ.
Triage Nurses Notes Reviewed? yes
Onset: Gradual
Duration: week(s):, waxing and waning
Timing: recent history
Injury Environment: home
Severity: moderate
Modifying Factors:
Improves With: rest. 
Associated Symptoms: chest pain
HPI:
34 yo gentleman
with schizophrenia
off meds x several weeks
presents with a constellation of symptoms.
 
He reports worsening auditory and visual hallucinations.  He has not slept in 4-
5 days.
 
He notes that he feels that there is an "infection in my body... a heaviness in 
my heart," as well as a pain on the bottom of his left foot for the past several
days.
 
He notes no fever, chills, abdominal pain, diarrhea.
 
He is otherwise well. 
(Conrad Martinez MD)
 
Past History
 
Travel History
Traveled to [place] past 21 day No
 
Medical History
Any Pertinent Medical History? see below for history
Neurological: NONE
EENT: NONE
Cardiovascular: hypertension
Respiratory: NONE
Gastrointestinal: NONE
Hepatic: NONE
Renal: NONE
Musculoskeletal: NONE
Psychiatric: schizo affective disorder
Endocrine: diabetes
Blood Disorders: NONE
Cancer(s): NONE
History of MRSA: No
History of VRE: No
History of CDIFF: No
Tetanus Vaccine: 08/28/13
 
Surgical History
Surgical History: non-contributory
 
Psychosocial History
Who do you live with Patient/Self
What is your primary language English
Tobacco Use: Current Daily Use
Daily Tobacco Use Amount/Type: => 5 Cigarettes daily
 
Family History
Hx Contributory? No
(Conrad Martinez MD)
 
Review of Systems
 
 
Physical Exam
 
Physical Exam
General Appearance: well developed/nourished, no apparent distress
Head: atraumatic
Eyes:
Bilateral: normal appearance. 
Ears, Nose, Throat: normal pharynx, normal ENT inspection
Neck: normal inspection, supple, full range of motion
Respiratory: normal breath sounds
Cardiovascular: regular rate/rhythm
Gastrointestinal: normal bowel sounds, soft, non-tender
Back: normal inspection
Extremities: non infected, but tender to palpation, foot bunion 3x3cm on left 
plantar surface. 
 
Core Measures
ACS in differential dx? No
CVA/TIA Diagnosis: No
Sepsis Present: No
Sepsis Focused Exam Completed? No
(Conrad Martinez MD)
 
Progress
Differential Diagnoses
I considered the following diagnoses in my evaluation of the patient: 
schizophrenia vs other.
as well as foot bunions, vs infection
chest pain eval - i doubt MI, acs vs other. 
 
Plan of Care:
 Laboratory Tests
 
 
 
09/24/18 0915:
Troponin I < 0.01
 
Initial ED EKG: sinus, anterior q waves
(Juan RICO,Conrad PORTER)
Comments:
9/24/2018 7:17:53 AM patient signed out to me by Dr. Martinez at shift change 
over.
 
I updated Efe on test results.  I notified him that there seemed to be no 
need for an antibiotic at this time as there seems to be no acute infection.  
Apart from this, he offered no complaint. He then requested to leave the 
emergency department stating that he would walk home since he lived only a short
distance from the hospital.
(Elsy RICO,Paul STEVEN)
 
Departure
 
Departure
Condition: Stable
Clinical Impression
Primary Impression: Schizophrenia
Secondary Impressions: Bunion, right foot, Chest pain
Referrals:
Patient Has No Primary Care Dr (PCP/Family)
 
Departure Forms:
Customer Survey
General Discharge Information
Comments
pt to be signed out to dr. chin, 9/24/18, 7am. 
(Juan RICO,Conrad PORTER)
 
Departure
Disposition: HOME OR SELF CARE
Additional Instructions:
Follow-up with your primary care doctor this week for reevaluation of your 
symptoms.  Please take all of your medications as prescribed.  Return if any 
concerns or sudden worsening.
 
If you do not currently have a primary care physician then please contact the 
[place] [facility] at the following phone number: [phone].
 
Please note that there might be incidental findings in your evaluation that are 
unrelated to the current emergency department visit.  Please notify your primary
care doctor about this emergency department visit in order to obtain and review 
all of the testing performed so that these incidental findings can be monitored 
as needed.
 
If you had an x-ray performed, please understand that some fractures or other 
findings may not be seen on the initial set of x-rays.  If your symptoms persist
you might need a repeat set of x-rays to check for such a fracture.
 
If you had a laceration evaluated, please understand that foreign bodies such as
glass or wood may not be visible to the naked eye or on plain x-rays.  If the 
wound becomes red, swollen, increasingly more painful or if there is any 
drainage from the wound, please have it reevaluated by a physician for the 
possibility of a retained foreign body.
 
*****If you're unable to follow up as outlined in the discharge instructions 
please return to the emergency department.******
 
Thank you for choosing the [facility] Emergency Department for your care.
It was a pleasure to serve you today.
 
Paul Chin M.D.
[facility]
 
(Elsy RICO,Paul STEVEN)
 
Critical Care Note
 
Critical Care Note
Critical Care Time: non-applicable
(Juan RICO,Conrad PORTER)
 
you might need a repeat set of x-rays to check for such a fracture.
 
If you had a laceration evaluated, please understand that foreign bodies such as
glass or wood may not be visible to the naked eye or on plain x-rays.  If the 
wound becomes red, swollen, increasingly more painful or if there is any 
drainage from the wound, please have it reevaluated by a physician for the 
possibility of a retained foreign body.
 
*****If you're unable to follow up as outlined in the discharge instructions 
please return to the emergency department.******
 
Thank you for choosing the [facility] Emergency Department for your care.
It was a pleasure to serve you today.
 
Paul Chin M.D.
[facility]
 
(Elsy RICO,Paul STEVEN)
 
Critical Care Note
 
Critical Care Note
Critical Care Time: non-applicable
(Conrad Martinez MD)

## 2018-09-24 NOTE — RADIOLOGY REPORT
EXAMINATION:
XR PORTABLE CHEST
 
CLINICAL INFORMATION:
Chest pain.
 
COMPARISON:
Chest radiograph 2/20/12
 
TECHNIQUE:
Portable frontal view of the chest was obtained.
 
FINDINGS:
 
Hypoventilatory study. The cardiac silhouette is top normal in size in the
setting of low lung volumes. No pneumothorax or large pleural effusion
identified. There is relative asymmetric hazy opacification of the right
hemithorax which may be related to patient body habitus.
 
IMPRESSION:
 
Markedly hypoventilatory study which limits overall evaluation. Asymmetric
hazy opacification of the right hemithorax may be related to patient body
habitus, though consider follow-up with repeat chest radiograph with patient
in the upright position if clinically warranted.

## 2023-07-16 NOTE — NUR
PT SITTING UP IN CHAIR. LEG BOUNCING CONSISTENT WITH ANXIETY. PT ASKING FOR
SOEMTHING TO RELAX HIM. GIVEN IM ATIVAN. PT GIVEN BOX DINNER no